# Patient Record
Sex: FEMALE | Race: WHITE | Employment: UNEMPLOYED | URBAN - METROPOLITAN AREA
[De-identification: names, ages, dates, MRNs, and addresses within clinical notes are randomized per-mention and may not be internally consistent; named-entity substitution may affect disease eponyms.]

---

## 2018-03-12 ENCOUNTER — HOSPITAL ENCOUNTER (OUTPATIENT)
Dept: OTHER | Age: 58
Discharge: HOME | End: 2018-03-12
Attending: INTERNAL MEDICINE | Admitting: INTERNAL MEDICINE

## 2018-03-12 LAB
A/G RATIO: 1.3 (ref 1.1–2.2)
ALBUMIN SERPL-MCNC: 4.2 G/DL (ref 3.4–5)
ALP BLD-CCNC: 106 U/L (ref 40–129)
ALT SERPL-CCNC: 20 U/L (ref 10–40)
ANION GAP SERPL CALCULATED.3IONS-SCNC: 12 MMOL/L (ref 3–16)
AST SERPL-CCNC: 18 U/L (ref 15–37)
BILIRUB SERPL-MCNC: <0.2 MG/DL (ref 0–1)
BUN BLDV-MCNC: 11 MG/DL (ref 7–20)
CALCIUM SERPL-MCNC: 9.4 MG/DL (ref 8.3–10.6)
CHLORIDE BLD-SCNC: 102 MMOL/L (ref 99–110)
CHOLESTEROL, FASTING: 235 MG/DL (ref 0–199)
CO2: 30 MMOL/L (ref 21–32)
CREAT SERPL-MCNC: 0.8 MG/DL (ref 0.6–1.1)
GFR AFRICAN AMERICAN: >60
GFR NON-AFRICAN AMERICAN: >60
GLOBULIN: 3.2 G/DL
GLUCOSE FASTING: 116 MG/DL (ref 70–99)
HCT VFR BLD CALC: 43.8 % (ref 36–48)
HDLC SERPL-MCNC: 61 MG/DL (ref 40–60)
HEMOGLOBIN: 15.1 G/DL (ref 12–16)
LDL CHOLESTEROL CALCULATED: 143 MG/DL
MCH RBC QN AUTO: 29.7 PG (ref 26–34)
MCHC RBC AUTO-ENTMCNC: 34.6 G/DL (ref 31–36)
MCV RBC AUTO: 85.9 FL (ref 80–100)
PDW BLD-RTO: 13.5 % (ref 12.4–15.4)
PLATELET # BLD: 196 K/UL (ref 135–450)
PMV BLD AUTO: 9.9 FL (ref 5–10.5)
POTASSIUM SERPL-SCNC: 5 MMOL/L (ref 3.5–5.1)
RBC # BLD: 5.1 M/UL (ref 4–5.2)
SODIUM BLD-SCNC: 144 MMOL/L (ref 136–145)
TOTAL PROTEIN: 7.4 G/DL (ref 6.4–8.2)
TRIGLYCERIDE, FASTING: 156 MG/DL (ref 0–150)
TSH SERPL DL<=0.05 MIU/L-ACNC: 0.1 UIU/ML (ref 0.27–4.2)
VITAMIN B-12: 474 PG/ML (ref 211–911)
VLDLC SERPL CALC-MCNC: 31 MG/DL
WBC # BLD: 7.9 K/UL (ref 4–11)

## 2018-05-14 ENCOUNTER — HOSPITAL ENCOUNTER (OUTPATIENT)
Dept: OTHER | Age: 58
Discharge: HOME | End: 2018-05-14
Attending: INTERNAL MEDICINE | Admitting: INTERNAL MEDICINE

## 2018-05-14 DIAGNOSIS — R05.9 COUGH: ICD-10-CM

## 2019-08-19 ENCOUNTER — HOSPITAL ENCOUNTER (OUTPATIENT)
Age: 59
Discharge: HOME | End: 2019-08-19
Payer: COMMERCIAL

## 2019-08-19 LAB
A/G RATIO: 1.4 (ref 1.1–2.2)
ALBUMIN SERPL-MCNC: 4.2 G/DL (ref 3.4–5)
ALP BLD-CCNC: 95 U/L (ref 40–129)
ALT SERPL-CCNC: 25 U/L (ref 10–40)
ANION GAP SERPL CALCULATED.3IONS-SCNC: 13 MMOL/L (ref 3–16)
AST SERPL-CCNC: 23 U/L (ref 15–37)
BACTERIA: ABNORMAL /HPF
BILIRUB SERPL-MCNC: <0.2 MG/DL (ref 0–1)
BILIRUBIN URINE: NEGATIVE
BLOOD, URINE: ABNORMAL
BUN BLDV-MCNC: 10 MG/DL (ref 7–20)
CALCIUM SERPL-MCNC: 9.6 MG/DL (ref 8.3–10.6)
CHLORIDE BLD-SCNC: 106 MMOL/L (ref 99–110)
CHOLESTEROL, TOTAL: 287 MG/DL (ref 0–199)
CLARITY: ABNORMAL
CO2: 23 MMOL/L (ref 21–32)
COLOR: ABNORMAL
CREAT SERPL-MCNC: 0.9 MG/DL (ref 0.6–1.1)
EPITHELIAL CELLS, UA: ABNORMAL /HPF
GFR AFRICAN AMERICAN: >60
GFR NON-AFRICAN AMERICAN: >60
GLOBULIN: 3 G/DL
GLUCOSE BLD-MCNC: 98 MG/DL (ref 70–99)
GLUCOSE URINE: NEGATIVE MG/DL
HCT VFR BLD CALC: 42.6 % (ref 36–48)
HDLC SERPL-MCNC: 62 MG/DL (ref 40–60)
HEMOGLOBIN: 14.7 G/DL (ref 12–16)
KETONES, URINE: NEGATIVE MG/DL
LDL CHOLESTEROL CALCULATED: 196 MG/DL
LEUKOCYTE ESTERASE, URINE: ABNORMAL
MCH RBC QN AUTO: 31.2 PG (ref 26–34)
MCHC RBC AUTO-ENTMCNC: 34.6 G/DL (ref 31–36)
MCV RBC AUTO: 90.1 FL (ref 80–100)
MICROSCOPIC EXAMINATION: YES
NITRITE, URINE: NEGATIVE
PDW BLD-RTO: 13.9 % (ref 12.4–15.4)
PH UA: 6 (ref 5–8)
PLATELET # BLD: 188 K/UL (ref 135–450)
PMV BLD AUTO: 11 FL (ref 5–10.5)
POTASSIUM SERPL-SCNC: 4.3 MMOL/L (ref 3.5–5.1)
PROTEIN UA: NEGATIVE MG/DL
RBC # BLD: 4.73 M/UL (ref 4–5.2)
RBC UA: ABNORMAL /HPF (ref 0–2)
SODIUM BLD-SCNC: 142 MMOL/L (ref 136–145)
SPECIFIC GRAVITY UA: <=1.005 (ref 1–1.03)
TOTAL PROTEIN: 7.2 G/DL (ref 6.4–8.2)
TRIGL SERPL-MCNC: 147 MG/DL (ref 0–150)
TSH SERPL DL<=0.05 MIU/L-ACNC: 15.73 UIU/ML (ref 0.27–4.2)
URINE TYPE: ABNORMAL
UROBILINOGEN, URINE: 0.2 E.U./DL
VITAMIN B-12: 484 PG/ML (ref 211–911)
VLDLC SERPL CALC-MCNC: 29 MG/DL
WBC # BLD: 8.9 K/UL (ref 4–11)
WBC UA: ABNORMAL /HPF (ref 0–5)

## 2019-08-19 PROCEDURE — 81001 URINALYSIS AUTO W/SCOPE: CPT

## 2019-08-19 PROCEDURE — 84443 ASSAY THYROID STIM HORMONE: CPT

## 2019-08-19 PROCEDURE — 80061 LIPID PANEL: CPT

## 2019-08-19 PROCEDURE — 82607 VITAMIN B-12: CPT

## 2019-08-19 PROCEDURE — 85027 COMPLETE CBC AUTOMATED: CPT

## 2019-08-19 PROCEDURE — 87086 URINE CULTURE/COLONY COUNT: CPT

## 2019-08-19 PROCEDURE — 80053 COMPREHEN METABOLIC PANEL: CPT

## 2019-08-19 PROCEDURE — 36415 COLL VENOUS BLD VENIPUNCTURE: CPT

## 2019-08-20 LAB — URINE CULTURE, ROUTINE: NORMAL

## 2020-01-11 ENCOUNTER — APPOINTMENT (OUTPATIENT)
Dept: CT IMAGING | Age: 60
End: 2020-01-11
Payer: COMMERCIAL

## 2020-01-11 ENCOUNTER — HOSPITAL ENCOUNTER (EMERGENCY)
Age: 60
Discharge: HOME | End: 2020-01-12
Attending: EMERGENCY MEDICINE
Payer: COMMERCIAL

## 2020-01-11 PROCEDURE — 99284 EMERGENCY DEPT VISIT MOD MDM: CPT

## 2020-01-11 PROCEDURE — 70450 CT HEAD/BRAIN W/O DYE: CPT

## 2020-01-11 RX ORDER — SODIUM CHLORIDE 9 MG/ML
INJECTION, SOLUTION INTRAVENOUS CONTINUOUS
Status: DISCONTINUED | OUTPATIENT
Start: 2020-01-11 | End: 2020-01-12

## 2020-01-11 RX ORDER — OMEPRAZOLE 20 MG/1
40 CAPSULE, DELAYED RELEASE ORAL DAILY
COMMUNITY

## 2020-01-11 RX ORDER — PROMETHAZINE HYDROCHLORIDE 25 MG/1
25 TABLET ORAL EVERY 6 HOURS PRN
COMMUNITY

## 2020-01-11 RX ORDER — MELOXICAM 15 MG/1
15 TABLET ORAL DAILY
COMMUNITY

## 2020-01-11 RX ORDER — FLUTICASONE FUROATE AND VILANTEROL 100; 25 UG/1; UG/1
POWDER RESPIRATORY (INHALATION) DAILY
COMMUNITY

## 2020-01-11 RX ORDER — BUSPIRONE HYDROCHLORIDE 15 MG/1
7.5 TABLET ORAL 2 TIMES DAILY
COMMUNITY

## 2020-01-11 RX ORDER — ALBUTEROL SULFATE 90 UG/1
2 AEROSOL, METERED RESPIRATORY (INHALATION) EVERY 6 HOURS PRN
COMMUNITY

## 2020-01-11 ASSESSMENT — PAIN SCALES - GENERAL: PAINLEVEL_OUTOF10: 6

## 2020-01-12 VITALS
OXYGEN SATURATION: 96 % | HEIGHT: 62 IN | WEIGHT: 221 LBS | HEART RATE: 63 BPM | BODY MASS INDEX: 40.67 KG/M2 | TEMPERATURE: 97.8 F | DIASTOLIC BLOOD PRESSURE: 66 MMHG | SYSTOLIC BLOOD PRESSURE: 128 MMHG | RESPIRATION RATE: 16 BRPM

## 2020-01-12 RX ORDER — LORAZEPAM 1 MG/1
1 TABLET ORAL EVERY 8 HOURS PRN
Qty: 4 TABLET | Refills: 0 | Status: SHIPPED | OUTPATIENT
Start: 2020-01-12 | End: 2020-02-11

## 2020-01-12 RX ORDER — MECLIZINE HYDROCHLORIDE 25 MG/1
25 TABLET ORAL 3 TIMES DAILY PRN
Qty: 15 TABLET | Refills: 0 | Status: SHIPPED | OUTPATIENT
Start: 2020-01-12 | End: 2020-01-22

## 2020-01-12 NOTE — ED PROVIDER NOTES
[address] ED  eMERGENCY dEPARTMENT eNCOUnter      Pt Name: David Elder  MRN: [id number]  [place] 1960  Date of evaluation: 1/11/2020  Provider: Gilmar Browne MD  PCP: Yadiel Mendoza MD      [address] [place] [address]       Chief Complaint   Patient presents with    Headache     Pt states that she has Hx of AVM and has had HA and dizziness x 4 days    Dizziness       HISTORY OFPRESENT ILLNESS   (Location/Symptom, Timing/Onset, Context/Setting, Quality, Duration, Modifying Factors,Severity)  Note limiting factors. David Elder is a 64 y.o. female is of a headache and dizziness that she is had for 4 days she says she has a history of an AVM in the past she had a seizure just before Christmas and since that time she is had a headache on the right side of her face she has no difficulty with weakness in the extremities does feel a bit dizzy she is able to get up and ambulate to her bed    Nursing Notes were all reviewed and agreed with or any disagreements were addressed  in the HPI. REVIEW OF SYSTEMS    (2-9 systems for level 4, 10 or more for level 5)     Review of Systems    Positives and Pertinent negatives as per HPI. Except as noted above in the ROS, all other systems were reviewed andnegative.        PASTMEDICAL HISTORY     Past Medical History:   Diagnosis Date    Arthritis     Asthma     AVM (arteriovenous malformation) brain     Difficult intubation     for subtotal thyroidectomy procedure: required use of the glidscope with cricoid pressure    Hyperlipidemia     Nausea & vomiting     Seizure disorder ([address]     seizure with anesthesia    Thyroid disease          SURGICAL HISTORY       Past Surgical History:   Procedure Laterality Date    CARDIAC CATHETERIZATION      brain angiogram    COLONOSCOPY      THYROIDECTOMY, PARTIAL  12/7/10    subtotal thyroidectomy    TONSILLECTOMY      TUBAL LIGATION           CURRENT MEDICATIONS       Previous Medications    ALBUTEROL SULFATE  (90 BASE) MCG/ACT INHALER    Inhale 2 puffs into the lungs every 6 hours as needed for Wheezing    BUSPIRONE (BUSPAR) 15 MG TABLET    Take 7.5 mg by mouth 2 times daily    CELECOXIB (CELEBREX) 200 MG CAPSULE    Take 200 mg by mouth 2 times daily. ESCITALOPRAM (LEXAPRO) 10 MG TABLET    Take 10 mg by mouth daily. FLUTICASONE-VILANTEROL (BREO ELLIPTA) 100-25 MCG/INH AEPB INHALER    Inhale into the lungs daily    LEVOTHYROXINE (SYNTHROID) 125 MCG TABLET    Take 125 mcg by mouth daily. MELOXICAM (MOBIC) 15 MG TABLET    Take 15 mg by mouth daily    OMEPRAZOLE (PRILOSEC) 20 MG DELAYED RELEASE CAPSULE    Take 40 mg by mouth daily    PRAVASTATIN (PRAVACHOL) 20 MG TABLET    Take 20 mg by mouth daily. PROMETHAZINE (PHENERGAN) 25 MG TABLET    Take 25 mg by mouth every 6 hours as needed for Nausea       ALLERGIES     Demerol; Aspirin; Darvocet [propoxyphene n-acetaminophen]; Ibuprofen; Iodides; Lidocaine; Tape [adhesive tape]; Codeine; Penicillins; Percocet [oxycodone-acetaminophen]; Sulfa antibiotics; and Vicodin [hydrocodone-acetaminophen]    FAMILY HISTORY     History reviewed. No pertinent family history.        SOCIAL HISTORY       Social History     Socioeconomic History    Marital status:      Spouse name: None    Number of children: None    Years of education: None    Highest education level: None   Occupational History    None   Social Needs    Financial resource strain: None    Food insecurity:     Worry: None     Inability: None    Transportation needs:     Medical: None     Non-medical: None   Tobacco Use    Smoking status: Current Every Day Smoker     Packs/day: 0.50     Types: Cigarettes    Smokeless tobacco: Never Used   Substance and Sexual Activity    Alcohol use: No     Comment: once every 2 months    Drug use: None    Sexual activity: Yes     Partners: Male   Lifestyle    Physical activity:     Days per week: None     Minutes per edit the dictations but occasionally words are mis-transcribed.)    Sandra Joel MD (electronically signed)      Sandra Joel MD  01/12/20 [id number]

## 2020-01-12 NOTE — ED NOTES
Patient being discharged home, discharge instructions printed and reviewed with patient along with prescriptions, denies any questions. Patient left ed via wheelchair, no signs of distress.      Kalyan Tony RN  01/12/20 [id number]

## 2020-03-07 ENCOUNTER — APPOINTMENT (OUTPATIENT)
Dept: GENERAL RADIOLOGY | Age: 60
End: 2020-03-07
Payer: COMMERCIAL

## 2020-03-07 ENCOUNTER — HOSPITAL ENCOUNTER (EMERGENCY)
Age: 60
Discharge: HOME | End: 2020-03-08
Attending: EMERGENCY MEDICINE
Payer: COMMERCIAL

## 2020-03-07 PROCEDURE — 99284 EMERGENCY DEPT VISIT MOD MDM: CPT

## 2020-03-07 PROCEDURE — 73120 X-RAY EXAM OF HAND: CPT

## 2020-03-07 ASSESSMENT — PAIN SCALES - GENERAL: PAINLEVEL_OUTOF10: 8

## 2020-03-07 ASSESSMENT — PAIN DESCRIPTION - ORIENTATION: ORIENTATION: RIGHT

## 2020-03-07 ASSESSMENT — PAIN DESCRIPTION - LOCATION: LOCATION: HAND

## 2020-03-08 ENCOUNTER — APPOINTMENT (OUTPATIENT)
Dept: CT IMAGING | Age: 60
End: 2020-03-08
Payer: COMMERCIAL

## 2020-03-08 VITALS
WEIGHT: 222 LBS | OXYGEN SATURATION: 98 % | HEIGHT: 62 IN | BODY MASS INDEX: 40.85 KG/M2 | SYSTOLIC BLOOD PRESSURE: 132 MMHG | HEART RATE: 84 BPM | RESPIRATION RATE: 16 BRPM | TEMPERATURE: 97.1 F | DIASTOLIC BLOOD PRESSURE: 80 MMHG

## 2020-03-08 PROCEDURE — 2500000003 HC RX 250 WO HCPCS: Performed by: EMERGENCY MEDICINE

## 2020-03-08 PROCEDURE — 96372 THER/PROPH/DIAG INJ SC/IM: CPT

## 2020-03-08 PROCEDURE — 6370000000 HC RX 637 (ALT 250 FOR IP): Performed by: EMERGENCY MEDICINE

## 2020-03-08 PROCEDURE — 73200 CT UPPER EXTREMITY W/O DYE: CPT

## 2020-03-08 RX ORDER — LEVOFLOXACIN 750 MG/1
750 TABLET ORAL DAILY
Qty: 7 TABLET | Refills: 0 | Status: SHIPPED | OUTPATIENT
Start: 2020-03-08 | End: 2020-03-15

## 2020-03-08 RX ORDER — HYDROXYZINE PAMOATE 50 MG/1
50 CAPSULE ORAL ONCE
Status: COMPLETED | OUTPATIENT
Start: 2020-03-08 | End: 2020-03-08

## 2020-03-08 RX ORDER — ONDANSETRON 4 MG/1
4 TABLET, ORALLY DISINTEGRATING ORAL ONCE
Status: DISCONTINUED | OUTPATIENT
Start: 2020-03-08 | End: 2020-03-08

## 2020-03-08 RX ORDER — HYDROXYZINE HYDROCHLORIDE 25 MG/1
50 TABLET, FILM COATED ORAL 3 TIMES DAILY PRN
Qty: 30 TABLET | Refills: 0 | Status: SHIPPED | OUTPATIENT
Start: 2020-03-08 | End: 2020-03-18

## 2020-03-08 RX ORDER — CLINDAMYCIN HYDROCHLORIDE 300 MG/1
300 CAPSULE ORAL 3 TIMES DAILY
Qty: 21 CAPSULE | Refills: 0 | Status: SHIPPED | OUTPATIENT
Start: 2020-03-08 | End: 2020-03-15

## 2020-03-08 RX ORDER — ONDANSETRON 4 MG/1
4 TABLET, ORALLY DISINTEGRATING ORAL
Status: DISCONTINUED | OUTPATIENT
Start: 2020-03-08 | End: 2020-03-08 | Stop reason: HOSPADM

## 2020-03-08 RX ORDER — LEVOFLOXACIN 750 MG/1
750 TABLET ORAL ONCE
Status: COMPLETED | OUTPATIENT
Start: 2020-03-08 | End: 2020-03-08

## 2020-03-08 RX ORDER — ONDANSETRON 4 MG/1
4 TABLET, ORALLY DISINTEGRATING ORAL EVERY 8 HOURS PRN
Qty: 12 TABLET | Refills: 0 | Status: SHIPPED | OUTPATIENT
Start: 2020-03-08

## 2020-03-08 RX ORDER — CLINDAMYCIN PHOSPHATE 150 MG/ML
600 INJECTION, SOLUTION INTRAVENOUS ONCE
Status: COMPLETED | OUTPATIENT
Start: 2020-03-08 | End: 2020-03-08

## 2020-03-08 RX ADMIN — CLINDAMYCIN PHOSPHATE 600 MG: 150 INJECTION, SOLUTION INTRAMUSCULAR; INTRAVENOUS at 03:26

## 2020-03-08 RX ADMIN — HYDROXYZINE PAMOATE 50 MG: 50 CAPSULE ORAL at 03:26

## 2020-03-08 RX ADMIN — LEVOFLOXACIN 750 MG: 750 TABLET, FILM COATED ORAL at 03:26

## 2020-03-08 NOTE — ED NOTES
Discharge instructions reviewed with Ms. Chaitanya Maher. She verbalized understanding. Copy of discharge instructions and prescriptions given. Ms. Chaitanya Maher was discharged to home in good condition per personal vehicle, family driving. She exited the ED without difficulty.         Kevin Jain RN  03/08/20 [id number]

## 2020-03-08 NOTE — ED PROVIDER NOTES
Inability: Not on file    Transportation needs     Medical: Not on file     Non-medical: Not on file   Tobacco Use    Smoking status: Current Every Day Smoker     Packs/day: 0.50     Types: Cigarettes    Smokeless tobacco: Never Used   Substance and Sexual Activity    Alcohol use: No     Comment: once every 2 months    Drug use: Never    Sexual activity: Yes     Partners: Male   Lifestyle    Physical activity     Days per week: Not on file     Minutes per session: Not on file    Stress: Not on file   Relationships    Social connections     Talks on phone: Not on file     Gets together: Not on file     Attends L[employer] day service: Not on file     Active member of club or organization: Not on file     Attends meetings of clubs or organizations: Not on file     Relationship status: Not on file    Intimate partner violence     Fear of current or ex partner: Not on file     Emotionally abused: Not on file     Physically abused: Not on file     Forced sexual activity: Not on file   Other Topics Concern    Not on file   Social History Narrative    Not on file     No current facility-administered medications for this encounter.       Current Outpatient Medications   Medication Sig Dispense Refill    hydrOXYzine (ATARAX) 25 MG tablet Take 2 tablets by mouth 3 times daily as needed for Itching or Anxiety 30 tablet 0    levoFLOXacin (LEVAQUIN) 750 MG tablet Take 1 tablet by mouth daily for 7 days 7 tablet 0    clindamycin (CLEOCIN) 300 MG capsule Take 1 capsule by mouth 3 times daily for 7 days 21 capsule 0    ondansetron (ZOFRAN ODT) 4 MG disintegrating tablet Take 1 tablet by mouth every 8 hours as needed for Nausea or Vomiting 12 tablet 0    busPIRone (BUSPAR) 15 MG tablet Take 7.5 mg by mouth 2 times daily      omeprazole (PRILOSEC) 20 MG delayed release capsule Take 40 mg by mouth daily      meloxicam (MOBIC) 15 MG tablet Take 15 mg by mouth daily      promethazine (PHENERGAN) 25 MG tablet Take 25 mg by mouth every 6 hours as needed for Nausea      albuterol sulfate  (90 Base) MCG/ACT inhaler Inhale 2 puffs into the lungs every 6 hours as needed for Wheezing      fluticasone-vilanterol (BREO ELLIPTA) 100-25 MCG/INH AEPB inhaler Inhale into the lungs daily      celecoxib (CELEBREX) 200 MG capsule Take 200 mg by mouth 2 times daily.  escitalopram (LEXAPRO) 10 MG tablet Take 10 mg by mouth daily.  levothyroxine (SYNTHROID) 125 MCG tablet Take 125 mcg by mouth daily.  pravastatin (PRAVACHOL) 20 MG tablet Take 20 mg by mouth daily. Allergies   Allergen Reactions    Benadryl [Diphenhydramine] Shortness Of Breath and Rash     Heart palpitations     Demerol Anaphylaxis    Aspirin Nausea Only    Darvocet [Propoxyphene N-Acetaminophen] Itching    Ibuprofen Itching    Iodides     Lidocaine     Other      Flu vaccine    Tape [Adhesive Tape]      tegaderm    Codeine Rash    Penicillins Rash    Percocet [Oxycodone-Acetaminophen] Nausea And Vomiting    Sulfa Antibiotics Nausea And Vomiting    Vicodin [Hydrocodone-Acetaminophen] Rash       REVIEW OF SYSTEMS  10 systems reviewed, pertinent positives per HPI otherwise noted to be negative. PHYSICAL EXAM  /80   Pulse 84   Temp 97.1 °F (36.2 °C) (Oral)   Resp 16   Ht 5' 2\" (1.575 m)   Wt 222 lb (100.7 kg)   LMP 03/04/2011   SpO2 98%   BMI 40.60 kg/m²   GENERAL APPEARANCE: Awake and alert. Cooperative. No acute distress  HEAD: Normocephalic. Atraumatic. EYES: PERRL. EOM's grossly intact. ENT: Mucous membranes are moist.   NECK: Supple. No rigidity  HEART: RRR. No murmurs  LUNGS: Respirations unlabored. Lungs are there to auscultation bilaterally. EXTREMITIES: No peripheral edema. Moves all extremities equally.   Right upper extremity exam, normal range of motion of elbow, shoulder, and wrist, she does have notable pain with range of motion of second and third digits, significant erythema/edema, no focal fluctuance IMPRESSION  1. Cat bite, initial encounter    2. Cellulitis of right hand    3. Urticarial rash        Blood pressure 132/80, pulse 84, temperature 97.1 °F (36.2 °C), temperature source Oral, resp. rate 16, height 5' 2\" (1.575 m), weight 222 lb (100.7 kg), last menstrual period 03/04/2011, SpO2 98 %. DISPOSITION  Danny Wu was discharged to home in stable condition.                   Jenaro Payton,   03/11/20 [id number]

## 2021-01-08 ENCOUNTER — HOSPITAL ENCOUNTER (OUTPATIENT)
Age: 61
Discharge: HOME | End: 2021-01-08
Payer: COMMERCIAL

## 2021-01-08 LAB
A/G RATIO: 1.1 (ref 1.1–2.2)
ALBUMIN SERPL-MCNC: 3.9 G/DL (ref 3.4–5)
ALP BLD-CCNC: 99 U/L (ref 40–129)
ALT SERPL-CCNC: 22 U/L (ref 10–40)
ANION GAP SERPL CALCULATED.3IONS-SCNC: 13 MMOL/L (ref 3–16)
AST SERPL-CCNC: 21 U/L (ref 15–37)
BILIRUB SERPL-MCNC: <0.2 MG/DL (ref 0–1)
BUN BLDV-MCNC: 10 MG/DL (ref 7–20)
CALCIUM SERPL-MCNC: 9.4 MG/DL (ref 8.3–10.6)
CHLORIDE BLD-SCNC: 104 MMOL/L (ref 99–110)
CHOLESTEROL, TOTAL: 267 MG/DL (ref 0–199)
CO2: 24 MMOL/L (ref 21–32)
CREAT SERPL-MCNC: 1 MG/DL (ref 0.6–1.1)
GFR AFRICAN AMERICAN: >60
GFR NON-AFRICAN AMERICAN: 57
GLOBULIN: 3.6 G/DL
GLUCOSE BLD-MCNC: 102 MG/DL (ref 70–99)
HCT VFR BLD CALC: 45.3 % (ref 36–48)
HDLC SERPL-MCNC: 55 MG/DL (ref 40–60)
HEMOGLOBIN: 15.1 G/DL (ref 12–16)
LDL CHOLESTEROL CALCULATED: 179 MG/DL
MCH RBC QN AUTO: 29.7 PG (ref 26–34)
MCHC RBC AUTO-ENTMCNC: 33.2 G/DL (ref 31–36)
MCV RBC AUTO: 89.3 FL (ref 80–100)
PDW BLD-RTO: 13.6 % (ref 12.4–15.4)
PLATELET # BLD: 186 K/UL (ref 135–450)
PLATELET SLIDE REVIEW: ADEQUATE
PMV BLD AUTO: 10.6 FL (ref 5–10.5)
POTASSIUM SERPL-SCNC: 4.2 MMOL/L (ref 3.5–5.1)
RBC # BLD: 5.08 M/UL (ref 4–5.2)
SLIDE REVIEW: ABNORMAL
SODIUM BLD-SCNC: 141 MMOL/L (ref 136–145)
TOTAL PROTEIN: 7.5 G/DL (ref 6.4–8.2)
TRIGL SERPL-MCNC: 164 MG/DL (ref 0–150)
TSH SERPL DL<=0.05 MIU/L-ACNC: 10.52 UIU/ML (ref 0.27–4.2)
VLDLC SERPL CALC-MCNC: 33 MG/DL
WBC # BLD: 9.3 K/UL (ref 4–11)

## 2021-01-08 PROCEDURE — 84443 ASSAY THYROID STIM HORMONE: CPT

## 2021-01-08 PROCEDURE — 36415 COLL VENOUS BLD VENIPUNCTURE: CPT

## 2021-01-08 PROCEDURE — 80061 LIPID PANEL: CPT

## 2021-01-08 PROCEDURE — 85027 COMPLETE CBC AUTOMATED: CPT

## 2021-01-08 PROCEDURE — 80053 COMPREHEN METABOLIC PANEL: CPT

## 2025-02-07 ENCOUNTER — HOSPITAL ENCOUNTER (OUTPATIENT)
Age: 65
Discharge: HOME | End: 2025-02-07
Payer: COMMERCIAL

## 2025-02-07 LAB
ALBUMIN SERPL-MCNC: 4 G/DL (ref 3.4–5)
ALBUMIN/GLOB SERPL: 1.3 {RATIO} (ref 1.1–2.2)
ALP SERPL-CCNC: 107 U/L (ref 40–129)
ALT SERPL-CCNC: 16 U/L (ref 10–40)
ANION GAP SERPL CALCULATED.3IONS-SCNC: 8 MMOL/L (ref 3–16)
AST SERPL-CCNC: 20 U/L (ref 15–37)
BILIRUB SERPL-MCNC: 0.3 MG/DL (ref 0–1)
BUN SERPL-MCNC: 9 MG/DL (ref 7–20)
CALCIUM SERPL-MCNC: 9 MG/DL (ref 8.3–10.6)
CHLORIDE SERPL-SCNC: 103 MMOL/L (ref 99–110)
CHOLEST SERPL-MCNC: 272 MG/DL (ref 0–199)
CO2 SERPL-SCNC: 30 MMOL/L (ref 21–32)
CREAT SERPL-MCNC: 1 MG/DL (ref 0.6–1.2)
DEPRECATED RDW RBC AUTO: 13.7 % (ref 12.4–15.4)
EST. AVERAGE GLUCOSE BLD GHB EST-MCNC: 116.9 MG/DL
GFR SERPLBLD CREATININE-BSD FMLA CKD-EPI: 64 ML/MIN/{1.73_M2}
GLUCOSE SERPL-MCNC: 113 MG/DL (ref 70–99)
HBA1C MFR BLD: 5.7 %
HCT VFR BLD AUTO: 42.6 % (ref 36–48)
HDLC SERPL-MCNC: 62 MG/DL (ref 40–60)
HGB BLD-MCNC: 14.3 G/DL (ref 12–16)
LDLC SERPL CALC-MCNC: 182 MG/DL
MCH RBC QN AUTO: 29.7 PG (ref 26–34)
MCHC RBC AUTO-ENTMCNC: 33.6 G/DL (ref 31–36)
MCV RBC AUTO: 88.3 FL (ref 80–100)
PLATELET # BLD AUTO: 205 K/UL (ref 135–450)
PMV BLD AUTO: 9.8 FL (ref 5–10.5)
POTASSIUM SERPL-SCNC: 4.3 MMOL/L (ref 3.5–5.1)
PROT SERPL-MCNC: 7 G/DL (ref 6.4–8.2)
RBC # BLD AUTO: 4.82 M/UL (ref 4–5.2)
SODIUM SERPL-SCNC: 141 MMOL/L (ref 136–145)
TRIGL SERPL-MCNC: 138 MG/DL (ref 0–150)
TSH SERPL DL<=0.005 MIU/L-ACNC: 15 UIU/ML (ref 0.27–4.2)
VLDLC SERPL CALC-MCNC: 28 MG/DL
WBC # BLD AUTO: 8.2 K/UL (ref 4–11)

## 2025-02-07 PROCEDURE — 80053 COMPREHEN METABOLIC PANEL: CPT

## 2025-02-07 PROCEDURE — 85027 COMPLETE CBC AUTOMATED: CPT

## 2025-02-07 PROCEDURE — 83036 HEMOGLOBIN GLYCOSYLATED A1C: CPT

## 2025-02-07 PROCEDURE — 36415 COLL VENOUS BLD VENIPUNCTURE: CPT

## 2025-02-07 PROCEDURE — 84443 ASSAY THYROID STIM HORMONE: CPT

## 2025-02-07 PROCEDURE — 80061 LIPID PANEL: CPT
